# Patient Record
Sex: MALE | Race: WHITE | Employment: FULL TIME | URBAN - METROPOLITAN AREA
[De-identification: names, ages, dates, MRNs, and addresses within clinical notes are randomized per-mention and may not be internally consistent; named-entity substitution may affect disease eponyms.]

---

## 2019-02-11 ENCOUNTER — HOSPITAL ENCOUNTER (OUTPATIENT)
Dept: CT IMAGING | Age: 54
Discharge: HOME | End: 2019-02-11
Payer: SELF-PAY

## 2019-02-11 DIAGNOSIS — Z00.00 PREVENTATIVE HEALTH CARE: ICD-10-CM

## 2019-02-11 PROCEDURE — 75571 CT HRT W/O DYE W/CA TEST: CPT

## 2019-02-13 NOTE — CARDIO/PULMONARY
Reached patient's home voicemail (identified as his in the message). Left a voice message with his coronary artery CT score of zero, a brief description of the meaning of this score, and my phone number in case of any questions.